# Patient Record
Sex: FEMALE | Race: WHITE | NOT HISPANIC OR LATINO | Employment: FULL TIME | URBAN - METROPOLITAN AREA
[De-identification: names, ages, dates, MRNs, and addresses within clinical notes are randomized per-mention and may not be internally consistent; named-entity substitution may affect disease eponyms.]

---

## 2019-04-11 ENCOUNTER — OFFICE VISIT (OUTPATIENT)
Dept: URGENT CARE | Facility: CLINIC | Age: 24
End: 2019-04-11
Payer: COMMERCIAL

## 2019-04-11 VITALS
RESPIRATION RATE: 16 BRPM | HEART RATE: 74 BPM | BODY MASS INDEX: 21.68 KG/M2 | WEIGHT: 127 LBS | DIASTOLIC BLOOD PRESSURE: 78 MMHG | SYSTOLIC BLOOD PRESSURE: 120 MMHG | HEIGHT: 64 IN | TEMPERATURE: 98.5 F | OXYGEN SATURATION: 96 %

## 2019-04-11 DIAGNOSIS — J06.9 UPPER RESPIRATORY TRACT INFECTION, UNSPECIFIED TYPE: ICD-10-CM

## 2019-04-11 DIAGNOSIS — M94.0 COSTOCHONDRITIS: ICD-10-CM

## 2019-04-11 PROCEDURE — 99203 OFFICE O/P NEW LOW 30 MIN: CPT | Performed by: PHYSICIAN ASSISTANT

## 2019-04-11 RX ORDER — CODEINE PHOSPHATE/GUAIFENESIN 10-100MG/5
5 LIQUID (ML) ORAL 3 TIMES DAILY PRN
Qty: 120 ML | Refills: 0 | Status: SHIPPED | OUTPATIENT
Start: 2019-04-11 | End: 2019-04-18

## 2019-04-11 RX ORDER — BENZONATATE 100 MG/1
100-200 CAPSULE ORAL 3 TIMES DAILY PRN
Qty: 60 CAP | Refills: 0 | Status: SHIPPED | OUTPATIENT
Start: 2019-04-11 | End: 2019-06-19

## 2019-04-11 RX ORDER — AZITHROMYCIN 250 MG/1
TABLET, FILM COATED ORAL
Qty: 6 TAB | Refills: 0 | Status: SHIPPED | OUTPATIENT
Start: 2019-04-11 | End: 2019-06-19

## 2019-04-19 ASSESSMENT — ENCOUNTER SYMPTOMS
SHORTNESS OF BREATH: 0
COUGH: 1
MUSCULOSKELETAL NEGATIVE: 1
FEVER: 1
DIZZINESS: 0
MYALGIAS: 0
SORE THROAT: 1
ABDOMINAL PAIN: 0
CHILLS: 1
WHEEZING: 0
VOMITING: 0
DIARRHEA: 0
NAUSEA: 0
SPUTUM PRODUCTION: 1

## 2019-04-19 ASSESSMENT — COPD QUESTIONNAIRES: COPD: 0

## 2019-04-19 NOTE — PROGRESS NOTES
"Subjective:      Caty Saba is a 23 y.o. female who presents with Other (patient says possible bronchitis,rash,chest pain,sluggish,x5 days ); Cough (x5 days,shortness of breath,productive/green ); and Fever (x5 days )            Cough   This is a new problem. The current episode started in the past 7 days (5 days). The problem has been unchanged. The problem occurs every few minutes. The cough is productive of sputum. Associated symptoms include chest pain, chills, a fever, nasal congestion, postnasal drip and a sore throat. Pertinent negatives include no ear pain, myalgias, rash, shortness of breath or wheezing. The symptoms are aggravated by lying down. She has tried OTC cough suppressant for the symptoms. The treatment provided mild relief. There is no history of asthma, COPD or pneumonia.     Patient denies history of chronic lung disease or pneumonia. No known sick contacts or recent use of antibiotics. No history of smoking.     Review of Systems   Constitutional: Positive for chills and fever.   HENT: Positive for congestion, postnasal drip and sore throat. Negative for ear pain.    Respiratory: Positive for cough and sputum production. Negative for shortness of breath and wheezing.    Cardiovascular: Positive for chest pain.   Gastrointestinal: Negative for abdominal pain, diarrhea, nausea and vomiting.   Genitourinary: Negative.    Musculoskeletal: Negative.  Negative for myalgias.   Skin: Negative for rash.   Neurological: Negative for dizziness.          Objective:     /78 (BP Location: Right arm, Patient Position: Sitting)   Pulse 74   Temp 36.9 °C (98.5 °F) (Temporal)   Resp 16   Ht 1.626 m (5' 4\")   Wt 57.6 kg (127 lb)   SpO2 96%   BMI 21.80 kg/m²      Physical Exam   Constitutional: She is oriented to person, place, and time. She appears well-developed and well-nourished. No distress.   HENT:   Head: Normocephalic and atraumatic.   Right Ear: Hearing, tympanic membrane, external ear " and ear canal normal.   Left Ear: Hearing, tympanic membrane, external ear and ear canal normal.   Mouth/Throat: Oropharynx is clear and moist. No oropharyngeal exudate, posterior oropharyngeal edema or posterior oropharyngeal erythema.   Eyes: Pupils are equal, round, and reactive to light. Conjunctivae are normal. Right eye exhibits no discharge. Left eye exhibits no discharge.   Neck: Normal range of motion.   Cardiovascular: Normal rate, regular rhythm and normal heart sounds.    No murmur heard.  Pulmonary/Chest: Effort normal. No respiratory distress. She has wheezes. She has rales. She exhibits tenderness.   Tenderness over anterior sternum to palaption   Musculoskeletal: Normal range of motion.   Lymphadenopathy:     She has no cervical adenopathy.   Neurological: She is alert and oriented to person, place, and time.   Skin: Skin is warm and dry. She is not diaphoretic.   Psychiatric: She has a normal mood and affect. Her behavior is normal.   Nursing note and vitals reviewed.         PMH:  has no past medical history on file.  MEDS:   Current Outpatient Prescriptions:   •  azithromycin (ZITHROMAX) 250 MG Tab, Take 2 tablets on day one, then 1 tablet on days two through five, Disp: 6 Tab, Rfl: 0  •  benzonatate (TESSALON) 100 MG Cap, Take 1-2 Caps by mouth 3 times a day as needed., Disp: 60 Cap, Rfl: 0  ALLERGIES: No Known Allergies  SURGHX: History reviewed. No pertinent surgical history.  SOCHX:  reports that she has never smoked. She has never used smokeless tobacco. She reports that she drinks alcohol. She reports that she does not use drugs.  FH: family history is not on file.       Assessment/Plan:     1. Upper respiratory tract infection, unspecified type  - azithromycin (ZITHROMAX) 250 MG Tab; Take 2 tablets on day one, then 1 tablet on days two through five  Dispense: 6 Tab; Refill: 0   - Complete full course of antibiotics as prescribed     - benzonatate (TESSALON) 100 MG Cap; Take 1-2 Caps by  mouth 3 times a day as needed.  Dispense: 60 Cap; Refill: 0  - guaifenesin-codeine (TUSSI-ORGANIDIN NR) 100-10 MG/5ML syrup; Take 5 mL by mouth 3 times a day as needed for Cough for up to 7 days.  Dispense: 120 mL; Refill: 0   - Will cause sedation, avoid driving, operating heavy machinery, and drinking alcohol    Call or return to office if symptoms persist or worsen, would recommend CXR at that time. The patient demonstrated a good understanding and agreed with the treatment plan.    2. Costochondritis    Encouraged OTC nsaids as needed for chest pain. Call or return to office if symptoms persist or worsen. The patient demonstrated a good understanding and agreed with the treatment plan.

## 2019-06-12 ENCOUNTER — TELEPHONE (OUTPATIENT)
Dept: SCHEDULING | Facility: IMAGING CENTER | Age: 24
End: 2019-06-12

## 2019-06-19 ENCOUNTER — OFFICE VISIT (OUTPATIENT)
Dept: URGENT CARE | Facility: CLINIC | Age: 24
End: 2019-06-19
Payer: COMMERCIAL

## 2019-06-19 VITALS
TEMPERATURE: 99.4 F | RESPIRATION RATE: 12 BRPM | BODY MASS INDEX: 21.68 KG/M2 | WEIGHT: 127 LBS | DIASTOLIC BLOOD PRESSURE: 78 MMHG | HEART RATE: 74 BPM | OXYGEN SATURATION: 96 % | SYSTOLIC BLOOD PRESSURE: 116 MMHG | HEIGHT: 64 IN

## 2019-06-19 DIAGNOSIS — G56.22 CUBITAL TUNNEL SYNDROME ON LEFT: Primary | ICD-10-CM

## 2019-06-19 DIAGNOSIS — Z98.890 HISTORY OF DECOMPRESSION OF ULNAR NERVE: ICD-10-CM

## 2019-06-19 DIAGNOSIS — M25.522 LEFT ELBOW PAIN: ICD-10-CM

## 2019-06-19 PROCEDURE — 99214 OFFICE O/P EST MOD 30 MIN: CPT | Performed by: PHYSICIAN ASSISTANT

## 2019-06-19 RX ORDER — NAPROXEN 500 MG/1
500 TABLET ORAL 2 TIMES DAILY WITH MEALS
Qty: 60 TAB | Refills: 0 | Status: SHIPPED | OUTPATIENT
Start: 2019-06-19 | End: 2019-12-24

## 2019-06-19 ASSESSMENT — ENCOUNTER SYMPTOMS
COUGH: 0
ABDOMINAL PAIN: 0
CONSTIPATION: 0
FALLS: 0
VOMITING: 0
SHORTNESS OF BREATH: 0
WEAKNESS: 0
DIARRHEA: 0
TINGLING: 1
NAUSEA: 0
CHILLS: 0
FEVER: 0

## 2019-06-20 NOTE — PROGRESS NOTES
"Subjective:   Caty Saba is a 24 y.o. female who presents for Elbow Pain (Has had pain for couple months but last 2 weeks has been worst )        Arm Pain    Associated symptoms include tingling.   The patient's pain started gradually 3-1/2 weeks ago.  Pain is described as a 7 out of 10 constant pain in the left elbow.  She has noticed intermittent swelling.  No erythema.  She is experiencing numbness and tingling along the fourth and fifth finger.  She does have a history of cubital tunnel syndrome/ulnar nerve entrapment requiring surgery twice.  The first surgery was in 2011, ulnar nerve transposition in the second surgery was in 2013 to correct persistent pain, completed by orthopedist in [place].  She denies recent injury.  No fever, chills.  No nausea, vomiting.  She has been treating the area with ibuprofen 800 mg twice a day, icing/heating/elevation      Review of Systems   Constitutional: Negative for chills, fever and malaise/fatigue.   Respiratory: Negative for cough and shortness of breath.    Gastrointestinal: Negative for abdominal pain, constipation, diarrhea, nausea and vomiting.   Musculoskeletal: Positive for joint pain. Negative for falls.   Neurological: Positive for tingling. Negative for weakness.   All other systems reviewed and are negative.      PMH:  has no past medical history on file.  MEDS:   Current Outpatient Prescriptions:   •  naproxen (NAPROSYN) 500 MG Tab, Take 1 Tab by mouth 2 times a day, with meals., Disp: 60 Tab, Rfl: 0  ALLERGIES: No Known Allergies  SURGHX: History reviewed. No pertinent surgical history.  SOCHX:  reports that she has never smoked. She has never used smokeless tobacco. She reports that she drinks alcohol. She reports that she does not use drugs.  History reviewed. No pertinent family history.     Objective:   /78   Pulse 74   Temp 37.4 °C (99.4 °F)   Resp 12   Ht 1.626 m (5' 4\")   Wt 57.6 kg (127 lb)   SpO2 96%   BMI 21.80 kg/m² "     Physical Exam   Constitutional: She is oriented to person, place, and time. She appears well-developed and well-nourished. No distress.   HENT:   Head: Normocephalic and atraumatic.   Nose: Nose normal.   Eyes: Pupils are equal, round, and reactive to light. Conjunctivae are normal.   Neck: Normal range of motion. Neck supple. No tracheal deviation present.   Cardiovascular: Normal rate and regular rhythm.    Pulmonary/Chest: Effort normal and breath sounds normal.   Musculoskeletal:        Left elbow: She exhibits normal range of motion and no swelling. Tenderness found. Medial epicondyle and olecranon process tenderness noted.        Left hand: Decreased sensation noted. Decreased sensation is present in the ulnar distribution.   Neurological: She is alert and oriented to person, place, and time.   Skin: Skin is warm and dry. Capillary refill takes less than 2 seconds.   Psychiatric: She has a normal mood and affect. Her behavior is normal.   Vitals reviewed.        Assessment/Plan:     1. Cubital tunnel syndrome on left  naproxen (NAPROSYN) 500 MG Tab    REFERRAL TO FOLLOW-UP WITH PRIMARY CARE    REFERRAL TO ORTHOPEDICS   2. Left elbow pain  naproxen (NAPROSYN) 500 MG Tab   3. History of decompression of ulnar nerve  REFERRAL TO ORTHOPEDICS     Supportive care reviewed.  Rest, ice, elevate.  Referral to ortho was placed.    Follow-up with primary care provider, referral placed.  If symptoms worsen or persist patient can return to clinic for reevaluation.  Red flags and emergency room precautions discussed. All side effects of medication discussed including allergic response, GI upset, tendon injury, etc. Patient verbalized understanding of information.    Please note that this dictation was created using voice recognition software. I have made every reasonable attempt to correct obvious errors, but I expect that there are errors of grammar and possibly content that I did not discover before finalizing the  note.

## 2019-06-21 ENCOUNTER — TELEPHONE (OUTPATIENT)
Dept: URGENT CARE | Facility: CLINIC | Age: 24
End: 2019-06-21

## 2019-06-21 NOTE — TELEPHONE ENCOUNTER
Patient called to say that the clinic she was referred to is not covered by her insurance. However, she said that [facility] is, and would like the referral changed to that clinic instead.

## 2019-12-24 ENCOUNTER — OFFICE VISIT (OUTPATIENT)
Dept: MEDICAL GROUP | Facility: PHYSICIAN GROUP | Age: 24
End: 2019-12-24
Payer: COMMERCIAL

## 2019-12-24 ENCOUNTER — HOSPITAL ENCOUNTER (OUTPATIENT)
Dept: LAB | Facility: MEDICAL CENTER | Age: 24
End: 2019-12-24
Attending: FAMILY MEDICINE
Payer: COMMERCIAL

## 2019-12-24 VITALS
HEART RATE: 81 BPM | DIASTOLIC BLOOD PRESSURE: 74 MMHG | SYSTOLIC BLOOD PRESSURE: 110 MMHG | TEMPERATURE: 98.4 F | HEIGHT: 64 IN | OXYGEN SATURATION: 99 % | BODY MASS INDEX: 22.53 KG/M2 | WEIGHT: 132 LBS

## 2019-12-24 DIAGNOSIS — R11.2 NON-INTRACTABLE VOMITING WITH NAUSEA, UNSPECIFIED VOMITING TYPE: ICD-10-CM

## 2019-12-24 DIAGNOSIS — R53.83 OTHER FATIGUE: ICD-10-CM

## 2019-12-24 LAB
ALBUMIN SERPL BCP-MCNC: 4.7 G/DL (ref 3.2–4.9)
ALBUMIN/GLOB SERPL: 1.9 G/DL
ALP SERPL-CCNC: 50 U/L (ref 30–99)
ALT SERPL-CCNC: 11 U/L (ref 2–50)
ANION GAP SERPL CALC-SCNC: 8 MMOL/L (ref 0–11.9)
APPEARANCE UR: CLEAR
AST SERPL-CCNC: 12 U/L (ref 12–45)
BASOPHILS # BLD AUTO: 1.5 % (ref 0–1.8)
BASOPHILS # BLD: 0.09 K/UL (ref 0–0.12)
BILIRUB SERPL-MCNC: 0.6 MG/DL (ref 0.1–1.5)
BILIRUB UR STRIP-MCNC: NORMAL MG/DL
BUN SERPL-MCNC: 10 MG/DL (ref 8–22)
CALCIUM SERPL-MCNC: 9.7 MG/DL (ref 8.5–10.5)
CHLORIDE SERPL-SCNC: 105 MMOL/L (ref 96–112)
CO2 SERPL-SCNC: 28 MMOL/L (ref 20–33)
COLOR UR AUTO: YELLOW
CREAT SERPL-MCNC: 0.92 MG/DL (ref 0.5–1.4)
EOSINOPHIL # BLD AUTO: 0.14 K/UL (ref 0–0.51)
EOSINOPHIL NFR BLD: 2.4 % (ref 0–6.9)
ERYTHROCYTE [DISTWIDTH] IN BLOOD BY AUTOMATED COUNT: 40 FL (ref 35.9–50)
GLOBULIN SER CALC-MCNC: 2.5 G/DL (ref 1.9–3.5)
GLUCOSE SERPL-MCNC: 92 MG/DL (ref 65–99)
GLUCOSE UR STRIP.AUTO-MCNC: NORMAL MG/DL
HCT VFR BLD AUTO: 45.5 % (ref 37–47)
HGB BLD-MCNC: 14.7 G/DL (ref 12–16)
IMM GRANULOCYTES # BLD AUTO: 0.01 K/UL (ref 0–0.11)
IMM GRANULOCYTES NFR BLD AUTO: 0.2 % (ref 0–0.9)
INT CON NEG: NEGATIVE
INT CON POS: POSITIVE
IRON SATN MFR SERPL: 20 % (ref 15–55)
IRON SERPL-MCNC: 83 UG/DL (ref 40–170)
KETONES UR STRIP.AUTO-MCNC: NORMAL MG/DL
LEUKOCYTE ESTERASE UR QL STRIP.AUTO: NORMAL
LYMPHOCYTES # BLD AUTO: 1.49 K/UL (ref 1–4.8)
LYMPHOCYTES NFR BLD: 25.5 % (ref 22–41)
MCH RBC QN AUTO: 29.7 PG (ref 27–33)
MCHC RBC AUTO-ENTMCNC: 32.3 G/DL (ref 33.6–35)
MCV RBC AUTO: 91.9 FL (ref 81.4–97.8)
MONOCYTES # BLD AUTO: 0.55 K/UL (ref 0–0.85)
MONOCYTES NFR BLD AUTO: 9.4 % (ref 0–13.4)
NEUTROPHILS # BLD AUTO: 3.57 K/UL (ref 2–7.15)
NEUTROPHILS NFR BLD: 61 % (ref 44–72)
NITRITE UR QL STRIP.AUTO: NORMAL
NRBC # BLD AUTO: 0 K/UL
NRBC BLD-RTO: 0 /100 WBC
PH UR STRIP.AUTO: 6.5 [PH] (ref 5–8)
PLATELET # BLD AUTO: 196 K/UL (ref 164–446)
PMV BLD AUTO: 11.8 FL (ref 9–12.9)
POC URINE PREGNANCY TEST: NEGATIVE
POTASSIUM SERPL-SCNC: 4.3 MMOL/L (ref 3.6–5.5)
PROT SERPL-MCNC: 7.2 G/DL (ref 6–8.2)
PROT UR QL STRIP: NORMAL MG/DL
RBC # BLD AUTO: 4.95 M/UL (ref 4.2–5.4)
RBC UR QL AUTO: NORMAL
SODIUM SERPL-SCNC: 141 MMOL/L (ref 135–145)
SP GR UR STRIP.AUTO: 1.02
TIBC SERPL-MCNC: 406 UG/DL (ref 250–450)
UROBILINOGEN UR STRIP-MCNC: 0.2 MG/DL
WBC # BLD AUTO: 5.9 K/UL (ref 4.8–10.8)

## 2019-12-24 PROCEDURE — 80053 COMPREHEN METABOLIC PANEL: CPT

## 2019-12-24 PROCEDURE — 36415 COLL VENOUS BLD VENIPUNCTURE: CPT

## 2019-12-24 PROCEDURE — 83540 ASSAY OF IRON: CPT

## 2019-12-24 PROCEDURE — 86038 ANTINUCLEAR ANTIBODIES: CPT

## 2019-12-24 PROCEDURE — 99214 OFFICE O/P EST MOD 30 MIN: CPT | Performed by: FAMILY MEDICINE

## 2019-12-24 PROCEDURE — 85025 COMPLETE CBC W/AUTO DIFF WBC: CPT

## 2019-12-24 PROCEDURE — 81025 URINE PREGNANCY TEST: CPT | Performed by: FAMILY MEDICINE

## 2019-12-24 PROCEDURE — 81002 URINALYSIS NONAUTO W/O SCOPE: CPT | Performed by: FAMILY MEDICINE

## 2019-12-24 PROCEDURE — 83550 IRON BINDING TEST: CPT

## 2019-12-24 RX ORDER — ONDANSETRON 4 MG/1
4 TABLET, FILM COATED ORAL EVERY 4 HOURS PRN
Qty: 20 TAB | Refills: 0 | Status: SHIPPED | OUTPATIENT
Start: 2019-12-24 | End: 2019-12-29

## 2019-12-24 SDOH — HEALTH STABILITY: MENTAL HEALTH: HOW MANY STANDARD DRINKS CONTAINING ALCOHOL DO YOU HAVE ON A TYPICAL DAY?: 3 OR 4

## 2019-12-24 SDOH — HEALTH STABILITY: MENTAL HEALTH: HOW OFTEN DO YOU HAVE 6 OR MORE DRINKS ON ONE OCCASION?: LESS THAN MONTHLY

## 2019-12-24 SDOH — HEALTH STABILITY: MENTAL HEALTH: HOW OFTEN DO YOU HAVE A DRINK CONTAINING ALCOHOL?: 2-3 TIMES A WEEK

## 2019-12-24 ASSESSMENT — PATIENT HEALTH QUESTIONNAIRE - PHQ9: CLINICAL INTERPRETATION OF PHQ2 SCORE: 0

## 2019-12-24 NOTE — PROGRESS NOTES
CC:  Diagnoses of Other fatigue and Non-intractable vomiting with nausea, unspecified vomiting type were pertinent to this visit.    HISTORY OF THE PRESENT ILLNESS: Patient is a 24 y.o. female. This pleasant patient is here today to establish new primary care provider.  She also states that she has been having worsening fatigue and nausea over the last 5 months.  Patient states that over the last 5 months when she left [place] she has had worsening fatigue and somnolence as well as bone pain and nausea.  Every once in a while she will be nauseous to the point of vomiting and she has been using marijuana 1 time per week to help with this.  Patient does have a history of leukemia with previous treatment to remission and was seen by her physician 6 months ago and had blood work done at that time that was normal.  She also states that she had a history of anemia and had been previously taking iron supplementation for this but is no longer taking anything.  States that her last menstrual period was 12/9/2019 lasted 3 days and was at its normal 28-day interval.  Patient has not had any abnormal bleeding.  She states that she is sexually active and uses condoms but is not currently on birth control.  She also has been noticing new rashes that coincide with her fatigue including on her face and bilateral arms.  The rashes onset and resolve spontaneously on their own.    Allergies: Patient has no known allergies.    Current Outpatient Medications Ordered in Epic   Medication Sig Dispense Refill   • ondansetron (ZOFRAN) 4 MG Tab tablet Take 1 Tab by mouth every four hours as needed for Nausea/Vomiting for up to 5 days. 20 Tab 0     No current Epic-ordered facility-administered medications on file.        Past Medical History:   Diagnosis Date   • Cancer (HCC)     Lymphoma       Past Surgical History:   Procedure Laterality Date   • LYMPH NODE SAMPLING Left 2017   • TONSILLECTOMY AND ADENOIDECTOMY Bilateral 2004   • ELBOW  "EXPLORATION         Social History     Tobacco Use   • Smoking status: Never Smoker   • Smokeless tobacco: Never Used   Substance Use Topics   • Alcohol use: Yes     Frequency: 2-3 times a week     Drinks per session: 3 or 4     Binge frequency: Less than monthly   • Drug use: Yes     Frequency: 1.0 times per week     Types: Marijuana       Social History     Patient does not qualify to have social determinant information on file (likely too young).   Social History Narrative   • Not on file       Family History   Problem Relation Age of Onset   • Cancer Mother         breast   • No Known Problems Father    • No Known Problems Sister    • No Known Problems Brother    • Cancer Maternal Grandmother         leukemia   • Cancer Maternal Grandfather         pancreatic   • Anemia Paternal Grandmother    • Cancer Paternal Grandfather         Lung, smoking   • Heart Disease Paternal Grandfather        ROS:   Constitutional: No Fevers, Chills  Eyes: No eye pain  ENT: No sore throat  Resp: No Shortness of breath  CV: No Chest pain  GI: No Nausea/Vomiting  MSK: No weakness  Neuro: No Headaches  Psych: No Suicidal ideations        Exam: /74 (BP Location: Right arm, Patient Position: Sitting, BP Cuff Size: Adult)   Pulse 81   Temp 36.9 °C (98.4 °F) (Temporal)   Ht 1.626 m (5' 4\")   Wt 59.9 kg (132 lb)   SpO2 99%  Body mass index is 22.66 kg/m².      GENERAL: No acute distress  HENT: Atraumatic, normocephalic  EYES: Extraocular movements intact, pupils equal and reactive to light  NECK: Supple, FROM  CHEST: No deformities, Equal chest expansion  RESP: Unlabored, no stridor or audible wheeze  ABD: Soft, Nontender, Non-Distended  Extremities: No Clubbing, Cyanosis, or Edema  Skin: Warm/dry, without rases  Neuro: A/O x 4, CN 2-12 Grossly intact, Motor/sensory grosly intact  Psych: Normal behavior, normal affect      Lab review:  Labs from 11/27/2017 regarding her iron, thyroid, CRP reviewed and normal.    Medical Records " Reviewed:  Reviewed 11/27/2017 office visit with oncology via care everywhere from [facility]    Imaging Review:  11/20/2017 MRI pelvis without contrast from [facility] reviewed demonstrating no abnormal findings.    Assessment/Plan:  1. Other fatigue  2. Non-intractable vomiting with nausea, unspecified vomiting type  New problem to provider with uncertain prognosis.  Labs as below.  Point-of-care urinalysis shows trace protein and trace leukocyte Estrace but otherwise normal, pregnancy test negative.  There is significant concern for recurrent lymphoma given her symptoms.  Requested the patient get labs done today and we will follow-up in 2 weeks.  - CBC WITH DIFFERENTIAL; Future  - Comp Metabolic Panel; Future  - MELVIN IGG KEANU W/RFLX TO MELVIN IGG IFA; Future  - IRON/TOTAL IRON BIND; Future  - FERRITIN; Future  - POCT Urinalysis  - POCT Pregnancy    Follow-up in 2 weeks to review labs    Please note that this dictation was created using voice recognition software. I have made every reasonable attempt to correct obvious errors, but I expect that there are errors of grammar and possibly content that I did not discover before finalizing the note.

## 2019-12-27 LAB
MISCELLANEOUS LAB RESULT MISCLAB: NORMAL
NUCLEAR IGG SER QL IA: NORMAL

## 2020-03-23 ENCOUNTER — TELEPHONE (OUTPATIENT)
Dept: HEALTH INFORMATION MANAGEMENT | Facility: OTHER | Age: 25
End: 2020-03-23

## 2020-03-23 NOTE — TELEPHONE ENCOUNTER
1. Caller Name: Caty                        Call Back Number: [phone]  [facility] PCP or Specialty Provider: Yes Dr. Cifuentes        2.  Does patient have any active symptoms of respiratory illness (fever OR cough OR shortness of breath OR sore throat)? Yes, the patient reports the following respiratory symptoms: cough and shortness of breath. Sweats and chills, body aches, shortness of breath x 2 days    3.  Does patient have any comoribidities? None     4.  Has the patient traveled in the last 14 days OR had any known contact with someone who is suspected or confirmed to have COVID-19?  No.    5. Disposition: Advised to go to [facility]. She verbalized agreement and understanding.     Note routed to [facility] Provider: FYI only.

## 2020-07-02 ENCOUNTER — HOSPITAL ENCOUNTER (EMERGENCY)
Facility: MEDICAL CENTER | Age: 25
End: 2020-07-02
Attending: EMERGENCY MEDICINE
Payer: COMMERCIAL

## 2020-07-02 ENCOUNTER — APPOINTMENT (OUTPATIENT)
Dept: RADIOLOGY | Facility: MEDICAL CENTER | Age: 25
End: 2020-07-02
Attending: EMERGENCY MEDICINE
Payer: COMMERCIAL

## 2020-07-02 VITALS
HEART RATE: 75 BPM | DIASTOLIC BLOOD PRESSURE: 72 MMHG | TEMPERATURE: 98.6 F | OXYGEN SATURATION: 96 % | RESPIRATION RATE: 16 BRPM | WEIGHT: 128.75 LBS | BODY MASS INDEX: 21.98 KG/M2 | HEIGHT: 64 IN | SYSTOLIC BLOOD PRESSURE: 111 MMHG

## 2020-07-02 DIAGNOSIS — Z20.822 SUSPECTED COVID-19 VIRUS INFECTION: ICD-10-CM

## 2020-07-02 LAB
COVID ORDER STATUS COVID19: NORMAL
EKG IMPRESSION: NORMAL

## 2020-07-02 PROCEDURE — 71045 X-RAY EXAM CHEST 1 VIEW: CPT

## 2020-07-02 PROCEDURE — 99284 EMERGENCY DEPT VISIT MOD MDM: CPT

## 2020-07-02 PROCEDURE — C9803 HOPD COVID-19 SPEC COLLECT: HCPCS | Performed by: EMERGENCY MEDICINE

## 2020-07-02 PROCEDURE — U0003 INFECTIOUS AGENT DETECTION BY NUCLEIC ACID (DNA OR RNA); SEVERE ACUTE RESPIRATORY SYNDROME CORONAVIRUS 2 (SARS-COV-2) (CORONAVIRUS DISEASE [COVID-19]), AMPLIFIED PROBE TECHNIQUE, MAKING USE OF HIGH THROUGHPUT TECHNOLOGIES AS DESCRIBED BY CMS-2020-01-R: HCPCS

## 2020-07-02 PROCEDURE — 93005 ELECTROCARDIOGRAM TRACING: CPT | Performed by: EMERGENCY MEDICINE

## 2020-07-02 ASSESSMENT — FIBROSIS 4 INDEX: FIB4 SCORE: 0.44

## 2020-07-02 NOTE — ED PROVIDER NOTES
ED Provider Note    Scribed for Joe Gonzales M.D. by Edwar Pierce. 7/2/2020  1:21 PM    Primary care provider: Bridger Cifuentes M.D.  Means of arrival: Walk-in  History obtained from: Patient  History limited by: None    CHIEF COMPLAINT  Chief Complaint   Patient presents with   • Shortness of Breath     since tuesday   • Fever     101.9    • Generalized Body Aches     since tuesday       HPI  Caty Saba is a 25 y.o. female who presents to the Emergency Department for evaluation of shortness of breath onset two days ago. She states shortness of breath is exacerbated with ambulation She admits to additional symptoms of worsening, constant headache, fever (T-max 101 °F, resolved last night), cough, and chest pain (Quality of pressure), but denies dysuria, hematuria, abdominal pain, nausea, or vomiting. She medicated with Tylenol last night, with significant alleviation of fever. She notes contact with a COVID positive person at work.     PPE Note: I personally donned full PPE for all patient encounters during this visit, including being clean-shaven with an N95 respirator mask, gloves, gown, and goggles.     Scribe remained outside the patient's room and did not have any contact with the patient for the duration of patient encounter.     REVIEW OF SYSTEMS  Pertinent positives include Shortness of breath, worsening, constant headache, fever (T-max 101 °F, resolved last night), cough, and chest pain (Quality of pressure). Pertinent negatives include no dysuria, hematuria, abdominal pain, nausea, or vomiting.  All other systems reviewed and negative.    PAST MEDICAL HISTORY   has a past medical history of Cancer (HCC).    SURGICAL HISTORY   has a past surgical history that includes lymph node sampling (Left, 2017); elbow exploration; and tonsillectomy and adenoidectomy (Bilateral, 2004).    SOCIAL HISTORY  Social History     Tobacco Use   • Smoking status: Never Smoker   • Smokeless tobacco: Never Used  "  Substance Use Topics   • Alcohol use: Yes     Frequency: 2-3 times a week     Drinks per session: 3 or 4     Binge frequency: Less than monthly   • Drug use: Yes     Frequency: 1.0 times per week     Types: Marijuana      Social History     Substance and Sexual Activity   Drug Use Yes   • Frequency: 1.0 times per week   • Types: Marijuana       FAMILY HISTORY  Family History   Problem Relation Age of Onset   • Cancer Mother         breast   • No Known Problems Father    • No Known Problems Sister    • No Known Problems Brother    • Cancer Maternal Grandmother         leukemia   • Cancer Maternal Grandfather         pancreatic   • Anemia Paternal Grandmother    • Cancer Paternal Grandfather         Lung, smoking   • Heart Disease Paternal Grandfather        CURRENT MEDICATIONS  Home Medications     Reviewed by Charisse Maza R.N. (Registered Nurse) on 07/02/20 at 1238  Med List Status: Complete   Medication Last Dose Status        Patient Denies Taking any Medications                       ALLERGIES  No Known Allergies    PHYSICAL EXAM  VITAL SIGNS: /75   Pulse 77   Temp 36.8 °C (98.2 °F) (Temporal)   Resp 18   Ht 1.626 m (5' 4\")   Wt 58.4 kg (128 lb 12 oz)   LMP 06/07/2020   SpO2 98%   BMI 22.10 kg/m²     Constitutional: Well developed, Well nourished, mild distress, Non-toxic appearance.   HENT: Normocephalic, Atraumatic, Bilateral external ears normal, Oropharynx moist, No oral exudates.   Eyes: PERRLA, EOMI, Conjunctiva normal, No discharge.   Neck: No tenderness, Supple, No stridor.   Lymphatic: No lymphadenopathy noted.   Cardiovascular: Normal heart rate, Normal rhythm.   Thorax & Lungs: Clear to auscultation bilaterally, No respiratory distress, No wheezing, No crackles.   Abdomen: Soft, No tenderness, No masses, No pulsatile masses.   Skin: Warm, Dry, No erythema, No rash.   Extremities:, No edema No cyanosis.   Musculoskeletal: No tenderness to palpation or major deformities noted.  Intact " distal pulses  Neurologic: Awake, alert. Moves all extremities spontaneously.  Psychiatric: Affect normal, Judgment normal, Mood normal.     LABS  Results for orders placed or performed during the hospital encounter of 20   COVID/SARS CoV-2 PCR    Specimen: Nasopharyngeal; Respirate   Result Value Ref Range    COVID Order Status Received    SARS-CoV-2, PCR (In-House)   Result Value Ref Range    SARS-CoV-2 Source NP Swab    EKG   Result Value Ref Range    Report       [facility] Emergency Dept.    Test Date:  2020  Pt Name:    HARIS JEWELL                   Department: ER  MRN:        [id number]                      Room:       [facility]  Gender:     Female                       Technician: [id number]  :        1995                   Requested By:HIPOLITO MARTINI  Order #:    [id number]                    Reading MD: HIPOLITO MARTINI MD    Measurements  Intervals                                Axis  Rate:       64                           P:          48  NE:         112                          QRS:        62  QRSD:       66                           T:          44  QT:         364  QTc:        376    Interpretive Statements  SINUS RHYTHM  BORDERLINE SHORT NE INTERVAL  No previous ECG available for comparison  Electronically Signed On 2020 15:07:45 PDT by HIPOLITO MARTINI MD          RADIOLOGY  DX-CHEST-PORTABLE (1 VIEW)   Final Result      No radiographic evidence of acute cardiopulmonary process.        The radiologist's interpretation of all radiological studies have been reviewed by me.      COURSE & MEDICAL DECISION MAKING  Pertinent Labs & Imaging studies reviewed. (See chart for details)    1:21 PM - Patient seen and examined at bedside. Ordered DX-CHEST, EKG, SARS-CoV-2 PCR now and in-house to evaluate her symptoms. The differential diagnoses include but are not limited to: URI versus COVID    3:14 PM - I reevaluated the patient at bedside. I discussed the patient's  diagnostic study results as noted above. Recommended she self quarantine secondary to suspected COVID and discussed home treatment. I discussed plan for discharge and follow up as outlined below. The patient verbalizes they feel comfortable going home. The patient is stable for discharge at this time and will return for any new or worsening symptoms. Patient verbalizes understanding and support with my plan for discharge.      Decision Making:  Patient with fever, shortness of breath, reported oxygen level in the 80s at home however here is 98, chest x-ray is negative, I believe the patient likely has COVID-19, sent off a 24-hour test, patient was instructed to return immediately with worsening symptoms, hypoxia, any other concerns.    The patient will return for new or worsening symptoms and is stable at the time of discharge.    The patient is referred to a primary physician for blood pressure management, diabetic screening, and for all other preventative health concerns.    DISPOSITION:  Patient will be discharged home in stable condition.    FOLLOW UP:  [facility], Emergency Dept  [address] [place] [id number] [phone]    In 1-2 days for recheck if not improved.      FINAL IMPRESSION  1. Suspected COVID-19 virus infection          Edwar LOVE (Ti), am scribing for, and in the presence of, Joe Gonzales M.D..    Electronically signed by: Edwar Pierce (Ti), 7/2/2020    Joe LOVE M.D. personally performed the services described in this documentation, as scribed by Edwar Pierce in my presence, and it is both accurate and complete.    The note accurately reflects work and decisions made by me.  Jeo Gonzales M.D.  7/2/2020  7:26 PM

## 2020-07-02 NOTE — ED NOTES
Pt. Discharged at this time, pt. Provided with AVS paperwork and teaching was done. Pt. Denies questions or concerns. Pt. Ambulated out of ED independently with a steady gait.

## 2020-07-02 NOTE — ED TRIAGE NOTES
Chief Complaint   Patient presents with   • Shortness of Breath     since tuesday   • Fever     101.9    • Generalized Body Aches     since tuesday     Also c/o dry cough. Pt has exposure to co-worker who is positive for covid19. Able to speak in full sentences. Charge notified. Educated on triage process. Instructed to notify staff for any worsening symptoms. Denies any recent travel. Denies exposure to known covid positive patients. Denies any respiratory symptoms.

## 2020-07-02 NOTE — DISCHARGE INSTRUCTIONS
You likely have a viral illness and may have COVID-19. At this point we do not have testing available in the outpatient setting here in the emergency department for COVID-19. We also have limited testing for flu and other viral illnesses due to national shortages.  Therefore, COVID-19 is not ruled out and you will need to remain in home quarantine until all three of the following are true:  You are 7 days from symptom onset  your symptoms are improving   you have been fever free for at least 72hours.    If you develop significant shortness of breath, meaning that it is difficult for you to walk even short distances without having to stop and catch your breath, or you become severely dizzy and this is persistent then please return to the emergency department.

## 2020-07-03 LAB
SARS-COV-2 RNA RESP QL NAA+PROBE: NOTDETECTED
SPECIMEN SOURCE: NORMAL

## 2020-07-04 NOTE — ED NOTES
COVID-19 Test Follow Up  07/03/20      Patient tested negative for COVID-19. I have informed the patient of the result by My Chart message. Encouraged to stay at home until no fever for 72 hours without the use of fever reducing medications and symptoms improving. Informed there is no need to further self-isolate for 14 days for COVID-19 unless otherwise directed by the Health Dept.     SARS-CoV-2 Source  NP Swab     SARS-CoV-2 by PCR  NotDetected        Marry Ramirez, PharmD